# Patient Record
Sex: FEMALE | URBAN - METROPOLITAN AREA
[De-identification: names, ages, dates, MRNs, and addresses within clinical notes are randomized per-mention and may not be internally consistent; named-entity substitution may affect disease eponyms.]

---

## 2024-01-24 ENCOUNTER — APPOINTMENT (RX ONLY)
Dept: URBAN - METROPOLITAN AREA CLINIC 103 | Facility: CLINIC | Age: 49
Setting detail: DERMATOLOGY
End: 2024-01-24

## 2024-01-24 DIAGNOSIS — L259 CONTACT DERMATITIS AND OTHER ECZEMA, UNSPECIFIED CAUSE: ICD-10-CM

## 2024-01-24 DIAGNOSIS — L29.89 OTHER PRURITUS: ICD-10-CM

## 2024-01-24 PROBLEM — L29.8 OTHER PRURITUS: Status: ACTIVE | Noted: 2024-01-24

## 2024-01-24 PROBLEM — L23.9 ALLERGIC CONTACT DERMATITIS, UNSPECIFIED CAUSE: Status: ACTIVE | Noted: 2024-01-24

## 2024-01-24 PROCEDURE — 99203 OFFICE O/P NEW LOW 30 MIN: CPT

## 2024-01-24 PROCEDURE — ? PRESCRIPTION

## 2024-01-24 PROCEDURE — ? COUNSELING

## 2024-01-24 PROCEDURE — ? PRESCRIPTION MEDICATION MANAGEMENT

## 2024-01-24 RX ORDER — HYDROCORTISONE 25 MG/G
CREAM TOPICAL
Qty: 30 | Refills: 2 | Status: ERX | COMMUNITY
Start: 2024-01-24

## 2024-01-24 RX ADMIN — HYDROCORTISONE: 25 CREAM TOPICAL at 00:00

## 2024-01-24 ASSESSMENT — LOCATION SIMPLE DESCRIPTION DERM
LOCATION SIMPLE: RIGHT CHEEK
LOCATION SIMPLE: LEFT FOREHEAD
LOCATION SIMPLE: LEFT CHEEK

## 2024-01-24 ASSESSMENT — LOCATION DETAILED DESCRIPTION DERM
LOCATION DETAILED: LEFT INFERIOR CENTRAL MALAR CHEEK
LOCATION DETAILED: RIGHT INFERIOR CENTRAL MALAR CHEEK
LOCATION DETAILED: LEFT MEDIAL FOREHEAD

## 2024-01-24 ASSESSMENT — LOCATION ZONE DERM: LOCATION ZONE: FACE

## 2024-01-24 NOTE — PROCEDURE: PRESCRIPTION MEDICATION MANAGEMENT
Detail Level: Zone
Render In Strict Bullet Format?: No
Initiate Treatment: Hydrocortisone 2.5% cream, apply to face BID x two weeks on, two weeks off cycle PRN flare\\nRecommends CeraVe, Cetaphil, and Dove sensitive skin

## 2024-02-07 ENCOUNTER — APPOINTMENT (RX ONLY)
Dept: URBAN - METROPOLITAN AREA CLINIC 103 | Facility: CLINIC | Age: 49
Setting detail: DERMATOLOGY
End: 2024-02-07

## 2024-02-07 DIAGNOSIS — L29.89 OTHER PRURITUS: ICD-10-CM | Status: RESOLVED

## 2024-02-07 DIAGNOSIS — Z71.89 OTHER SPECIFIED COUNSELING: ICD-10-CM

## 2024-02-07 DIAGNOSIS — L259 CONTACT DERMATITIS AND OTHER ECZEMA, UNSPECIFIED CAUSE: ICD-10-CM | Status: RESOLVED

## 2024-02-07 DIAGNOSIS — L81.0 POSTINFLAMMATORY HYPERPIGMENTATION: ICD-10-CM

## 2024-02-07 DIAGNOSIS — K13.0 DISEASES OF LIPS: ICD-10-CM

## 2024-02-07 PROBLEM — L23.9 ALLERGIC CONTACT DERMATITIS, UNSPECIFIED CAUSE: Status: ACTIVE | Noted: 2024-02-07

## 2024-02-07 PROBLEM — L29.8 OTHER PRURITUS: Status: ACTIVE | Noted: 2024-02-07

## 2024-02-07 PROCEDURE — ? ADDITIONAL NOTES

## 2024-02-07 PROCEDURE — ? PRESCRIPTION MEDICATION MANAGEMENT

## 2024-02-07 PROCEDURE — ? COUNSELING

## 2024-02-07 PROCEDURE — 99213 OFFICE O/P EST LOW 20 MIN: CPT

## 2024-02-07 PROCEDURE — ? PRESCRIPTION

## 2024-02-07 RX ORDER — AZELAIC ACID 0.15 G/G
GEL TOPICAL
Qty: 50 | Refills: 3 | Status: ERX | COMMUNITY
Start: 2024-02-07

## 2024-02-07 RX ADMIN — AZELAIC ACID: 0.15 GEL TOPICAL at 00:00

## 2024-02-07 ASSESSMENT — ITCH INTENSITY: HOW SEVERE IS YOUR ITCHING?: 0

## 2024-02-07 ASSESSMENT — LOCATION DETAILED DESCRIPTION DERM
LOCATION DETAILED: RIGHT INFERIOR VERMILION LIP
LOCATION DETAILED: INFERIOR MID FOREHEAD
LOCATION DETAILED: LEFT MEDIAL FOREHEAD
LOCATION DETAILED: LEFT INFERIOR CENTRAL MALAR CHEEK
LOCATION DETAILED: RIGHT INFERIOR CENTRAL MALAR CHEEK

## 2024-02-07 ASSESSMENT — LOCATION SIMPLE DESCRIPTION DERM
LOCATION SIMPLE: RIGHT CHEEK
LOCATION SIMPLE: LEFT FOREHEAD
LOCATION SIMPLE: RIGHT LIP
LOCATION SIMPLE: LEFT CHEEK
LOCATION SIMPLE: INFERIOR FOREHEAD

## 2024-02-07 ASSESSMENT — LOCATION ZONE DERM
LOCATION ZONE: LIP
LOCATION ZONE: FACE

## 2024-02-07 NOTE — PROCEDURE: ADDITIONAL NOTES
Additional Notes: Because the patient does wear make up occasionally, and believes the Jyoti make up she used instigated the issue, the patient was recommended to use Bare Minerals makeup.
Detail Level: Simple
Render Risk Assessment In Note?: no

## 2024-06-26 ENCOUNTER — APPOINTMENT (RX ONLY)
Dept: URBAN - METROPOLITAN AREA CLINIC 103 | Facility: CLINIC | Age: 49
Setting detail: DERMATOLOGY
End: 2024-06-26

## 2024-06-26 DIAGNOSIS — K13.0 DISEASES OF LIPS: ICD-10-CM | Status: INADEQUATELY CONTROLLED

## 2024-06-26 DIAGNOSIS — Z71.89 OTHER SPECIFIED COUNSELING: ICD-10-CM

## 2024-06-26 DIAGNOSIS — L30.9 DERMATITIS, UNSPECIFIED: ICD-10-CM | Status: INADEQUATELY CONTROLLED

## 2024-06-26 DIAGNOSIS — L29.89 OTHER PRURITUS: ICD-10-CM | Status: IMPROVED

## 2024-06-26 DIAGNOSIS — L85.3 XEROSIS CUTIS: ICD-10-CM

## 2024-06-26 DIAGNOSIS — L81.0 POSTINFLAMMATORY HYPERPIGMENTATION: ICD-10-CM

## 2024-06-26 PROBLEM — L29.8 OTHER PRURITUS: Status: ACTIVE | Noted: 2024-06-26

## 2024-06-26 PROCEDURE — ? GENTLE SKIN CARE INSTRUCTIONS

## 2024-06-26 PROCEDURE — ? COUNSELING

## 2024-06-26 PROCEDURE — ? PRESCRIPTION MEDICATION MANAGEMENT

## 2024-06-26 PROCEDURE — 99214 OFFICE O/P EST MOD 30 MIN: CPT

## 2024-06-26 PROCEDURE — ? PRESCRIPTION

## 2024-06-26 RX ORDER — CLINDAMYCIN PHOSPHATE 10 MG/ML
LOTION TOPICAL
Qty: 60 | Refills: 3 | Status: ERX | COMMUNITY
Start: 2024-06-26

## 2024-06-26 RX ORDER — HYDROQUINONE 40 MG/G
CREAM TOPICAL
Qty: 28.4 | Refills: 6 | Status: ERX | COMMUNITY
Start: 2024-06-26

## 2024-06-26 RX ADMIN — CLINDAMYCIN PHOSPHATE: 10 LOTION TOPICAL at 00:00

## 2024-06-26 RX ADMIN — HYDROQUINONE: 40 CREAM TOPICAL at 00:00

## 2024-06-26 ASSESSMENT — LOCATION DETAILED DESCRIPTION DERM
LOCATION DETAILED: LEFT MEDIAL FOREHEAD
LOCATION DETAILED: RIGHT INFERIOR VERMILION LIP
LOCATION DETAILED: RIGHT INFERIOR CENTRAL MALAR CHEEK
LOCATION DETAILED: INFERIOR MID FOREHEAD
LOCATION DETAILED: LEFT INFERIOR CENTRAL MALAR CHEEK
LOCATION DETAILED: RIGHT INFERIOR MEDIAL FOREHEAD

## 2024-06-26 ASSESSMENT — LOCATION ZONE DERM
LOCATION ZONE: FACE
LOCATION ZONE: LIP

## 2024-06-26 ASSESSMENT — LOCATION SIMPLE DESCRIPTION DERM
LOCATION SIMPLE: LEFT CHEEK
LOCATION SIMPLE: LEFT FOREHEAD
LOCATION SIMPLE: RIGHT CHEEK
LOCATION SIMPLE: INFERIOR FOREHEAD
LOCATION SIMPLE: RIGHT FOREHEAD
LOCATION SIMPLE: RIGHT LIP

## 2024-06-26 ASSESSMENT — ITCH INTENSITY: HOW SEVERE IS YOUR ITCHING?: 4

## 2024-06-26 ASSESSMENT — ITCH NUMERIC RATING SCALE: HOW SEVERE IS YOUR ITCHING?: 4

## 2024-06-26 NOTE — PROCEDURE: GENTLE SKIN CARE INSTRUCTIONS
Detail Level: Simple
Gentle Skin Care Counseling: I recommended use a gentle skin cleanser when washing the skin. I also recommended application of a moisturizer twice daily. Products with fragrances, preservatives and dyes should be avoided.

## 2024-06-26 NOTE — PROCEDURE: PRESCRIPTION MEDICATION MANAGEMENT
Glasses Prescription given to patient.
Detail Level: Zone
Render In Strict Bullet Format?: No
Modify Regimen: Hold azelaic acid 15 % topical gel
Plan: Patient states hydroquinone led to redness and irritation of the skin in the past. Patient agreed to trial the hydroquinone again in smaller intervals. If the patient experiences any irritation or redness, patient has been advised to contact the office and we will send her SkinBetter Pigmentation cream to be used once a day at night.
Plan: Recommended Bare Minerals make up line.\\nRecommended CeraVe, Cetaphil, and Dove sensitive skin.
Continue Regimen: Hydrocortisone 2.5% cream twice weekly
Initiate Treatment: clindamycin 1 % lotion \\nQuantity: 60.0 ml\\nSig: Apply to the face twice a day.

## 2025-08-15 ENCOUNTER — APPOINTMENT (OUTPATIENT)
Dept: URBAN - METROPOLITAN AREA CLINIC 122 | Facility: CLINIC | Age: 50
Setting detail: DERMATOLOGY
End: 2025-08-15

## 2025-08-15 DIAGNOSIS — L21.8 OTHER SEBORRHEIC DERMATITIS: ICD-10-CM

## 2025-08-15 DIAGNOSIS — L65.8 OTHER SPECIFIED NONSCARRING HAIR LOSS: ICD-10-CM | Status: INADEQUATELY CONTROLLED

## 2025-08-15 PROCEDURE — ? PHOTO-DOCUMENTATION

## 2025-08-15 PROCEDURE — ? PRESCRIPTION

## 2025-08-15 PROCEDURE — ? COUNSELING

## 2025-08-15 PROCEDURE — ? MEDICATION COUNSELING

## 2025-08-15 PROCEDURE — ? FULL BODY SKIN EXAM - DECLINED

## 2025-08-15 PROCEDURE — ? DEFER

## 2025-08-15 RX ORDER — HYDROCORTISONE 25 MG/G
CREAM TOPICAL BID
Qty: 30 | Refills: 2 | Status: ERX | COMMUNITY
Start: 2025-08-15

## 2025-08-15 RX ORDER — MINOXIDIL/FINASTERIDE 7 %-0.1 %
1 APP SOLUTION, NON-ORAL TOPICAL BID
Qty: 60 | Refills: 0 | Status: ERX | COMMUNITY
Start: 2025-08-15

## 2025-08-15 RX ORDER — KETOCONAZOLE 20 MG/G
CREAM TOPICAL BID
Qty: 30 | Refills: 6 | Status: ERX | COMMUNITY
Start: 2025-08-15

## 2025-08-15 RX ADMIN — KETOCONAZOLE: 20 CREAM TOPICAL at 00:00

## 2025-08-15 RX ADMIN — HYDROCORTISONE: 25 CREAM TOPICAL at 00:00

## 2025-08-15 RX ADMIN — Medication 1 APP: at 00:00

## 2025-08-15 ASSESSMENT — LOCATION DETAILED DESCRIPTION DERM
LOCATION DETAILED: LEFT SUPERIOR PARIETAL SCALP
LOCATION DETAILED: LEFT INFERIOR MEDIAL MALAR CHEEK
LOCATION DETAILED: LEFT CENTRAL OCCIPITAL SCALP
LOCATION DETAILED: RIGHT INFERIOR MEDIAL MALAR CHEEK
LOCATION DETAILED: RIGHT CENTRAL EYEBROW
LOCATION DETAILED: LEFT SUPERIOR MEDIAL FOREHEAD
LOCATION DETAILED: LEFT CENTRAL EYEBROW

## 2025-08-15 ASSESSMENT — LOCATION SIMPLE DESCRIPTION DERM
LOCATION SIMPLE: LEFT CHEEK
LOCATION SIMPLE: LEFT EYEBROW
LOCATION SIMPLE: RIGHT EYEBROW
LOCATION SIMPLE: LEFT FOREHEAD
LOCATION SIMPLE: SCALP
LOCATION SIMPLE: RIGHT CHEEK

## 2025-08-15 ASSESSMENT — LOCATION ZONE DERM
LOCATION ZONE: FACE
LOCATION ZONE: SCALP